# Patient Record
Sex: FEMALE | Race: WHITE | Employment: FULL TIME | URBAN - NONMETROPOLITAN AREA
[De-identification: names, ages, dates, MRNs, and addresses within clinical notes are randomized per-mention and may not be internally consistent; named-entity substitution may affect disease eponyms.]

---

## 2019-06-19 ENCOUNTER — OFFICE VISIT (OUTPATIENT)
Dept: FAMILY MEDICINE CLINIC | Age: 24
End: 2019-06-19

## 2019-06-19 ENCOUNTER — HOSPITAL ENCOUNTER (OUTPATIENT)
Age: 24
Discharge: HOME | End: 2019-06-21

## 2019-06-19 VITALS
WEIGHT: 220 LBS | SYSTOLIC BLOOD PRESSURE: 112 MMHG | DIASTOLIC BLOOD PRESSURE: 84 MMHG | TEMPERATURE: 97.6 F | OXYGEN SATURATION: 98 % | HEART RATE: 101 BPM | HEIGHT: 67 IN | BODY MASS INDEX: 34.53 KG/M2

## 2019-06-19 DIAGNOSIS — J02.9 SORE THROAT: Primary | ICD-10-CM

## 2019-06-19 DIAGNOSIS — J02.9 SORE THROAT: ICD-10-CM

## 2019-06-19 LAB — S PYO AG THROAT QL: NORMAL

## 2019-06-19 PROCEDURE — 99213 OFFICE O/P EST LOW 20 MIN: CPT | Performed by: NURSE PRACTITIONER

## 2019-06-19 PROCEDURE — 87081 CULTURE SCREEN ONLY: CPT

## 2019-06-19 PROCEDURE — 87880 STREP A ASSAY W/OPTIC: CPT | Performed by: NURSE PRACTITIONER

## 2019-06-19 RX ORDER — AMOXICILLIN 875 MG/1
875 TABLET, COATED ORAL 2 TIMES DAILY
Qty: 20 TABLET | Refills: 0 | Status: SHIPPED | OUTPATIENT
Start: 2019-06-19 | End: 2019-06-29

## 2019-06-22 LAB — S PYO THROAT QL CULT: NORMAL

## 2022-01-29 ENCOUNTER — HOSPITAL ENCOUNTER (OUTPATIENT)
Age: 27
Discharge: HOME | End: 2022-01-29

## 2022-01-29 LAB
ALBUMIN SERPL-MCNC: 4.5 G/DL (ref 3.5–5.2)
ALP BLD-CCNC: 105 U/L (ref 35–104)
ALT SERPL-CCNC: 13 U/L (ref 0–32)
ANION GAP SERPL CALCULATED.3IONS-SCNC: 11 MMOL/L (ref 7–16)
AST SERPL-CCNC: 13 U/L (ref 0–31)
BASOPHILS ABSOLUTE: 0.05 E9/L (ref 0–0.2)
BASOPHILS RELATIVE PERCENT: 0.8 % (ref 0–2)
BILIRUB SERPL-MCNC: 0.4 MG/DL (ref 0–1.2)
BUN BLDV-MCNC: 9 MG/DL (ref 6–20)
CALCIUM SERPL-MCNC: 9.5 MG/DL (ref 8.6–10.2)
CHLORIDE BLD-SCNC: 102 MMOL/L (ref 98–107)
CHOLESTEROL, TOTAL: 137 MG/DL (ref 0–199)
CO2: 27 MMOL/L (ref 22–29)
CREAT SERPL-MCNC: 0.8 MG/DL (ref 0.5–1)
EOSINOPHILS ABSOLUTE: 0.21 E9/L (ref 0.05–0.5)
EOSINOPHILS RELATIVE PERCENT: 3.4 % (ref 0–6)
GFR AFRICAN AMERICAN: >60
GFR NON-AFRICAN AMERICAN: >60 ML/MIN/1.73
GLUCOSE BLD-MCNC: 99 MG/DL (ref 74–99)
HCT VFR BLD CALC: 41.7 % (ref 34–48)
HDLC SERPL-MCNC: 39 MG/DL
HEMOGLOBIN: 13.2 G/DL (ref 11.5–15.5)
IMMATURE GRANULOCYTES #: 0.03 E9/L
IMMATURE GRANULOCYTES %: 0.5 % (ref 0–5)
LDL CHOLESTEROL CALCULATED: 85 MG/DL (ref 0–99)
LYMPHOCYTES ABSOLUTE: 1.69 E9/L (ref 1.5–4)
LYMPHOCYTES RELATIVE PERCENT: 27.3 % (ref 20–42)
MCH RBC QN AUTO: 27 PG (ref 26–35)
MCHC RBC AUTO-ENTMCNC: 31.7 % (ref 32–34.5)
MCV RBC AUTO: 85.5 FL (ref 80–99.9)
MONOCYTES ABSOLUTE: 0.41 E9/L (ref 0.1–0.95)
MONOCYTES RELATIVE PERCENT: 6.6 % (ref 2–12)
NEUTROPHILS ABSOLUTE: 3.8 E9/L (ref 1.8–7.3)
NEUTROPHILS RELATIVE PERCENT: 61.4 % (ref 43–80)
PDW BLD-RTO: 13.3 FL (ref 11.5–15)
PLATELET # BLD: 254 E9/L (ref 130–450)
PMV BLD AUTO: 11.3 FL (ref 7–12)
POTASSIUM SERPL-SCNC: 4.5 MMOL/L (ref 3.5–5)
RBC # BLD: 4.88 E12/L (ref 3.5–5.5)
SODIUM BLD-SCNC: 140 MMOL/L (ref 132–146)
T4 FREE: 1.12 NG/DL (ref 0.93–1.7)
TOTAL PROTEIN: 7.5 G/DL (ref 6.4–8.3)
TRIGL SERPL-MCNC: 66 MG/DL (ref 0–149)
TSH SERPL DL<=0.05 MIU/L-ACNC: 2.1 UIU/ML (ref 0.27–4.2)
VITAMIN D 25-HYDROXY: 54 NG/ML (ref 30–100)
VLDLC SERPL CALC-MCNC: 13 MG/DL
WBC # BLD: 6.2 E9/L (ref 4.5–11.5)

## 2022-01-29 PROCEDURE — 36415 COLL VENOUS BLD VENIPUNCTURE: CPT

## 2022-01-29 PROCEDURE — 82306 VITAMIN D 25 HYDROXY: CPT

## 2022-01-29 PROCEDURE — 84443 ASSAY THYROID STIM HORMONE: CPT

## 2022-01-29 PROCEDURE — 84439 ASSAY OF FREE THYROXINE: CPT

## 2022-01-29 PROCEDURE — 80053 COMPREHEN METABOLIC PANEL: CPT

## 2022-01-29 PROCEDURE — 85025 COMPLETE CBC W/AUTO DIFF WBC: CPT

## 2022-01-29 PROCEDURE — 80061 LIPID PANEL: CPT

## 2024-05-21 ENCOUNTER — HOSPITAL ENCOUNTER (OUTPATIENT)
Dept: ULTRASOUND IMAGING | Age: 29
Discharge: HOME | End: 2024-05-23
Payer: COMMERCIAL

## 2024-05-21 DIAGNOSIS — M79.621 PAIN IN RIGHT UPPER ARM: ICD-10-CM

## 2024-05-21 DIAGNOSIS — M54.2 CERVICALGIA: ICD-10-CM

## 2024-05-21 DIAGNOSIS — E04.9 NONTOXIC GOITER, UNSPECIFIED: ICD-10-CM

## 2024-05-21 PROCEDURE — 76536 US EXAM OF HEAD AND NECK: CPT

## 2024-05-21 PROCEDURE — 76882 US LMTD JT/FCL EVL NVASC XTR: CPT

## 2024-11-20 ENCOUNTER — OFFICE VISIT (OUTPATIENT)
Dept: ENDOCRINOLOGY | Age: 29
End: 2024-11-20
Payer: COMMERCIAL

## 2024-11-20 VITALS
OXYGEN SATURATION: 99 % | HEIGHT: 68 IN | BODY MASS INDEX: 39.56 KG/M2 | DIASTOLIC BLOOD PRESSURE: 88 MMHG | RESPIRATION RATE: 18 BRPM | WEIGHT: 261 LBS | HEART RATE: 127 BPM | SYSTOLIC BLOOD PRESSURE: 134 MMHG

## 2024-11-20 DIAGNOSIS — E04.2 MULTINODULAR GOITER: ICD-10-CM

## 2024-11-20 DIAGNOSIS — E28.2 PCOS (POLYCYSTIC OVARIAN SYNDROME): ICD-10-CM

## 2024-11-20 DIAGNOSIS — E24.9 HYPERCORTISOLISM (HCC): Primary | ICD-10-CM

## 2024-11-20 PROCEDURE — 99204 OFFICE O/P NEW MOD 45 MIN: CPT | Performed by: INTERNAL MEDICINE

## 2024-11-20 PROCEDURE — G2211 COMPLEX E/M VISIT ADD ON: HCPCS | Performed by: INTERNAL MEDICINE

## 2024-11-20 RX ORDER — SEMAGLUTIDE 0.25 MG/.5ML
0.25 INJECTION, SOLUTION SUBCUTANEOUS
COMMUNITY

## 2024-11-20 RX ORDER — METOPROLOL SUCCINATE 25 MG/1
25 TABLET, EXTENDED RELEASE ORAL DAILY
COMMUNITY

## 2024-11-20 RX ORDER — DEXAMETHASONE 1 MG
1 TABLET ORAL ONCE
Qty: 1 TABLET | Refills: 0 | Status: SHIPPED | OUTPATIENT
Start: 2024-11-20 | End: 2024-11-20

## 2024-11-20 NOTE — PROGRESS NOTES
[facility] PHYSICIANS [facility] Department of Endocrinology Diabetes and Metabolism   [facility] PHYSICIANS [place] SPECIALTY [facility] BD ENDO  [address], [address]  [facility] [address]  Dept: [phone]  Loc: [phone]     Date of Service: 11/20/2024  Primary Care Physician: Temitope Mcguire MD  Provider: Sawyer Jack MD            Reason for the visit:  Multinodular goiter     History of Present Illness:  The history is provided by the patient. No  was used. Accuracy of the patient data is excellent.  Keith Valdez is a very pleasant 29 y.o. female seen today for evaluation and management of multinodular goiter     The patient was found to have thyroid nodule on self exam in 2022     US   Thyroid US 5/2024 --> Rt cystic 6 mm and Lt hyperechoic 9 mm     Lab Results   Component Value Date/Time    TSH 2.100 01/29/2022 11:13 AM    T4FREE 1.12 01/29/2022 11:13 AM       Lab in blood work in July 26, 2024 showed a TSH of 2.46, total T4 of 7.3.    The patient currently not on thyroid medications    Patient c/o hair loss, irregular cycle , Wt gain     Keith Valdez denies any new lumps, bumps in her neck, voice change, or shortness of breath. No family history of thyroid cancer. No prior history of radiation to head or neck region.    PAST MEDICAL HISTORY   No past medical history on file.    PAST SURGICAL HISTORY   Past Surgical History:   Procedure Laterality Date    ENDOSCOPY, COLON, DIAGNOSTIC  03/01/2017       SOCIAL HISTORY   Tobacco:   reports that she has never smoked. She has never used smokeless tobacco.  Alcohol:   reports no history of alcohol use.  Drugs:   reports no history of drug use.    FAMILY HISTORY   No family history on file.    ALLERGIES AND DRUG REACTIONS   No Known Allergies    CURRENT MEDICATIONS   Current Outpatient Medications   Medication Sig Dispense Refill    metoprolol succinate (TOPROL XL) 25 MG extended release tablet Take 1 tablet by

## 2024-12-12 DIAGNOSIS — E24.9 HYPERCORTISOLISM (HCC): ICD-10-CM

## 2024-12-12 DIAGNOSIS — E28.2 PCOS (POLYCYSTIC OVARIAN SYNDROME): ICD-10-CM

## 2024-12-12 DIAGNOSIS — E04.2 MULTINODULAR GOITER: ICD-10-CM

## 2024-12-12 LAB
ANION GAP SERPL CALCULATED.3IONS-SCNC: 8 MMOL/L (ref 7–16)
BUN BLDV-MCNC: 9 MG/DL (ref 6–20)
CALCIUM SERPL-MCNC: 9.4 MG/DL (ref 8.6–10.2)
CHLORIDE BLD-SCNC: 103 MMOL/L (ref 98–107)
CO2: 29 MMOL/L (ref 22–29)
CORTISOL COLLECTION INFO: 0
CORTISOL: 0.5 UG/DL (ref 2.7–18.4)
CREAT SERPL-MCNC: 0.7 MG/DL (ref 0.5–1)
ESTRADIOL LEVEL: 66 PG/ML
FOLLICLE STIMULATING HORMONE: 5.4 MIU/ML
GFR, ESTIMATED: >90 ML/MIN/1.73M2
GLUCOSE BLD-MCNC: 120 MG/DL (ref 74–99)
HBA1C MFR BLD: 5.7 % (ref 4–5.6)
LH: 9.5 MIU/ML
POTASSIUM SERPL-SCNC: 4.8 MMOL/L (ref 3.5–5)
PROLACTIN: 17.55 NG/ML
SODIUM BLD-SCNC: 140 MMOL/L (ref 132–146)
T4 FREE: 1.1 NG/DL (ref 0.9–1.7)
TSH SERPL DL<=0.05 MIU/L-ACNC: 2 UIU/ML (ref 0.27–4.2)

## 2024-12-13 LAB
DHEAS (DHEA SULFATE): 254 UG/DL (ref 98.8–340)
SEX HORMONE BINDING GLOBULIN: 17 NMOL/L (ref 25–122)
TESTOSTERONE FREE-NONMALE: 15.2 PG/ML (ref 0.8–7.4)
TESTOSTERONE TOTAL: 60 NG/DL (ref 8–48)
TESTOSTERONE, BIOAVAILABLE: 35.5 NG/DL (ref 2.2–20.6)

## 2024-12-18 LAB — 17 OH PROGESTERONE: 30.09 NG/DL

## 2024-12-20 ENCOUNTER — TELEPHONE (OUTPATIENT)
Dept: ENDOCRINOLOGY | Age: 29
End: 2024-12-20

## 2024-12-20 NOTE — TELEPHONE ENCOUNTER
Notify patient  The blood work is very consistent with PCOS.  A.m. cortisol appropriately suppressed following the 1 mg dexamethasone suppression test and this result essentially rules out Cushing's.  All your current symptomatology can be easily explained by the PCOS.    Weight loss is the main treatment for PCOS.    I strongly recommend seeing our weight loss clinic to discuss different weight loss strategies.  The referral is in

## 2025-01-08 ENCOUNTER — TELEPHONE (OUTPATIENT)
Dept: BARIATRICS/WEIGHT MGMT | Age: 30
End: 2025-01-08

## 2025-02-24 ENCOUNTER — OFFICE VISIT (OUTPATIENT)
Dept: ENDOCRINOLOGY | Age: 30
End: 2025-02-24

## 2025-02-24 VITALS
WEIGHT: 263 LBS | DIASTOLIC BLOOD PRESSURE: 89 MMHG | TEMPERATURE: 98.4 F | RESPIRATION RATE: 18 BRPM | BODY MASS INDEX: 39.86 KG/M2 | OXYGEN SATURATION: 100 % | HEIGHT: 68 IN | SYSTOLIC BLOOD PRESSURE: 123 MMHG | HEART RATE: 97 BPM

## 2025-02-24 DIAGNOSIS — E66.01 MORBID OBESITY: ICD-10-CM

## 2025-02-24 DIAGNOSIS — E28.2 PCOS (POLYCYSTIC OVARIAN SYNDROME): Primary | ICD-10-CM

## 2025-02-24 NOTE — PROGRESS NOTES
power normal. No tremors   Psych: normal mood, and affect    Review of Laboratory Data:  I personally reviewed the following labs:   Lab Results   Component Value Date/Time    WBC 6.2 01/29/2022 11:13 AM    RBC 4.88 01/29/2022 11:13 AM    HGB 13.2 01/29/2022 11:13 AM    HCT 41.7 01/29/2022 11:13 AM    MCV 85.5 01/29/2022 11:13 AM    MCH 27.0 01/29/2022 11:13 AM    MCHC 31.7 (L) 01/29/2022 11:13 AM    RDW 13.3 01/29/2022 11:13 AM     01/29/2022 11:13 AM    MPV 11.3 01/29/2022 11:13 AM      Lab Results   Component Value Date/Time     12/12/2024 08:28 AM    K 4.8 12/12/2024 08:28 AM    CO2 29 12/12/2024 08:28 AM    BUN 9 12/12/2024 08:28 AM    CREATININE 0.7 12/12/2024 08:28 AM    CALCIUM 9.4 12/12/2024 08:28 AM    LABGLOM >90 12/12/2024 08:28 AM    GFRAA >60 01/29/2022 11:13 AM      Lab Results   Component Value Date/Time    TSH 2.00 12/12/2024 08:28 AM    T4FREE 1.1 12/12/2024 08:28 AM     Lab Results   Component Value Date/Time    LABA1C 5.7 12/12/2024 08:28 AM    GLUCOSE 120 12/12/2024 08:28 AM     Lab Results   Component Value Date/Time    TRIG 66 01/29/2022 11:13 AM    HDL 39 01/29/2022 11:13 AM    CHOL 137 01/29/2022 11:13 AM     Lab Results   Component Value Date/Time    VITD25 54 01/29/2022 11:13 AM       ASSESSMENT & RECOMMENDATIONS   Keith Valdez, a 29 y.o.-old female seen in for the following issues     Multinodular goiter   Prevalence of thyroid nodule on thyroid ultrasound is 50% and 95 % of these nodules are benign.  US 5/20-24 tiny nodules measuring 6 and 9 mm , will follow with intermittent US     Elevated serum cortisol level  The patient has slightly elevated a.m. cortisol on the previous blood work.  I had a long discussion with the patient explained to her that morning cortisol should not be used to diagnose hypercortisolism.  A.m. cortisol suppressed to 0.5 mg following 1 mg dexamethasone suppression test in December 2024 at this result essentially rules out